# Patient Record
Sex: FEMALE | Race: WHITE | URBAN - METROPOLITAN AREA
[De-identification: names, ages, dates, MRNs, and addresses within clinical notes are randomized per-mention and may not be internally consistent; named-entity substitution may affect disease eponyms.]

---

## 2016-07-08 LAB
CREATININE, EXTERNAL: 0.7
LDL-C, EXTERNAL: 86

## 2017-01-06 ENCOUNTER — TELEPHONE (OUTPATIENT)
Dept: FAMILY MEDICINE CLINIC | Age: 37
End: 2017-01-06

## 2017-01-06 NOTE — TELEPHONE ENCOUNTER
Pt is requesting labwork; she just had an appt with gastro with [facility] [address] and she ordered her some lab work. Pt would like to know if Dr. Camila Barber could put in a lab for liver function so she can get her lab done in the office here. Please advise.

## 2017-03-20 ENCOUNTER — TELEPHONE (OUTPATIENT)
Dept: FAMILY MEDICINE CLINIC | Age: 37
End: 2017-03-20

## 2017-03-21 RX ORDER — NARATRIPTAN 2.5 MG/1
2.5 TABLET ORAL
Qty: 9 TAB | Refills: 2 | Status: SHIPPED | OUTPATIENT
Start: 2017-03-21 | End: 2018-01-29 | Stop reason: SDUPTHER

## 2017-05-18 ENCOUNTER — OFFICE VISIT (OUTPATIENT)
Dept: FAMILY MEDICINE CLINIC | Age: 37
End: 2017-05-18

## 2017-05-18 VITALS
HEIGHT: 64 IN | SYSTOLIC BLOOD PRESSURE: 101 MMHG | WEIGHT: 134 LBS | OXYGEN SATURATION: 99 % | RESPIRATION RATE: 18 BRPM | DIASTOLIC BLOOD PRESSURE: 66 MMHG | TEMPERATURE: 98.3 F | HEART RATE: 66 BPM | BODY MASS INDEX: 22.88 KG/M2

## 2017-05-18 DIAGNOSIS — J06.9 VIRAL URI: Primary | ICD-10-CM

## 2017-05-18 RX ORDER — AZITHROMYCIN 250 MG/1
TABLET, FILM COATED ORAL
Qty: 6 TAB | Refills: 0 | Status: SHIPPED | OUTPATIENT
Start: 2017-05-18 | End: 2017-05-23

## 2017-05-18 RX ORDER — MOMETASONE FUROATE 50 UG/1
2 SPRAY, METERED NASAL DAILY
Qty: 2 CONTAINER | Refills: 2 | Status: SHIPPED | OUTPATIENT
Start: 2017-05-18 | End: 2018-10-03

## 2017-05-18 NOTE — PROGRESS NOTES
1. Have you been to the ER, urgent care clinic since your last visit? Hospitalized since your last visit? No    2. Have you seen or consulted any other health care providers outside of the [address] [place] [address] since your last visit? Include any pap smears or colon screening.  Yes When: 1/2017 GI

## 2017-05-18 NOTE — PROGRESS NOTES
[facility] [address] [facility] [place] [address] Manuel Rae, [facility], [address] [place] [address]  Phone [phone]  Fax [phone]    Date of Service:  2017  Patient's Name: Víctor Albert   Patient's :  1980     Subjective/Discussion        Chief Complaint   Patient presents with    Nasal Congestion    Cough        Víctor Albert is a pleasant 43 y.o. female patient of Parvez Grover MD who presented with complaints of upper respiratory symptoms for 5 days. Woke up with sore throat Saturday. No fevers or sweats, but also hasn't checked temperature at home. Afebrile in office today  Reports mostly dry tickling cough and sinus congestion started Tuesday. No hx of allergies. Feels sick, fatigued. Usually in good health, but states she gets sick 1-2x a year. Sore throat is improved. Stomach is a little upset. Taking sudafed, nasonex, did take Afrin last night. Cough is daytime only. Daughter had a recent illness which resolved without treatment. No rashes, no diarrhea, no breathing difficulty. Exam:   VS:    Visit Vitals    /66 (BP 1 Location: Right arm, BP Patient Position: Sitting)    Pulse 66    Temp 98.3 °F (36.8 °C) (Oral)    Resp 18    Ht 5' 3.5\" (1.613 m)    Wt 134 lb (60.8 kg)    SpO2 99%    BMI 23.36 kg/m2       General:   Pleasant age appropriate female, slender, appears well, well-groomed, conversant, alert, in no acute distress.      Head:  Normocephalic, atraumatic  Ears:  External ears WNL, no pain with movement of pinna, no TTP of mastoid processes    EACs with some dark danuta cerumen in right side, clear on left    TMs WNL bilaterally with no bulging, or erythema, no medial effusions present  Eyes:  EOMI, PERRL, no pain with eye movements    No conjunctival injection, abnormal tearing, discharge, chemosis  Mouth: MMM, o/p WNL without membranes, exudates, ulcerations or petechiae  Nose:  External nares WNL    Nasal turbinates congested with clear and white mucus evident  Neck:  Neck supple with normal ROM for age, no thyromegaly, no LAD  Cardiovasc:   Regular rate and rhythm, no murmurs, no rubs, no gallops  Pulmonary:   Clear breath sounds bilaterally, good air movement,    No wheezing, no rales, no rhonchi, normal respiratory effort  Extremities:   No dependent edema, no tenderness with palpation of calves,     Warm and well-perfused at distal extremities  Skin:    No rashes noted. No results found for this or any previous visit (from the past 12 hour(s)). Encounter Diagnoses:     Encounter Diagnoses     ICD-10-CM ICD-9-CM   1. Viral URI J06.9 465.9    B97.89        Assessment/Plan:       Jefferson Ruiz was seen today for likely viral URI. D/w patient, she feels strongly that she needs treatment with Thirza Pals. Advised not likely to be of benefit, that I feel it is unnecessary treatment and risks of taking an antibiotic such as development of antibiotic resistance, infectious colitis and modification of gut diego. After discussion, patient states her understanding and preference to proceed with Thirza Pals. She will continue sudafed, nasonex and add nasal irrigation. She is to monitor for complications from antibiotic. Brissa Santana was seen today for nasal congestion and cough. Diagnoses and all orders for this visit:    Viral URI  -     mometasone (NASONEX) 50 mcg/actuation nasal spray; 2 Sprays by Both Nostrils route daily. Other orders  -     azithromycin (ZITHROMAX) 250 mg tablet; Take 2 tablets today, then take 1 tablet daily      The patient states understanding and agrees with the treatment plan. All questions were answered.     Jayla Calzada MD - Internal Medicine  5/18/2017, 2:06 PM    Patient Care Team:  Sawyer Vasquez MD as PCP - General (Internal Medicine)        Additional History     Past Medical History:   Diagnosis Date    Anxiety     Depression     Insomnia     Migraine      Past Surgical History:   Procedure Laterality Date    HX CHOLECYSTECTOMY  2009     Social History   Substance Use Topics    Smoking status: Former Smoker    Smokeless tobacco: Never Used    Alcohol use Yes      Comment: One Drink a week     Current Outpatient Prescriptions   Medication Sig    naratriptan (AMERGE) 2.5 mg tab Take 1 Tab by mouth once as needed for up to 1 dose.  topiramate (TOPAMAX) 50 mg tablet Take 1 Tab by mouth two (2) times daily (with meals).  mirtazapine (REMERON) 45 mg tablet Take 45 mg by mouth nightly.  gabapentin (NEURONTIN) 300 mg capsule Take 300 mg by mouth three (3) times daily. 1 capsule in am, 1 capsule at noon and 3 capsules at bedtime    omeprazole (PRILOSEC) 40 mg capsule Take 1 Cap by mouth daily. No current facility-administered medications for this visit. No Known Allergies         This document may have been created with the aid of dictation software. Text may contain errors, particularly phonetic errors.

## 2017-05-18 NOTE — MR AVS SNAPSHOT
Visit Information Date & Time Provider Department Dept. Phone Encounter #  
 5/18/2017  2:00 PM Marv Roldan, [facility], [place], operated [facility] [phone] [id number] Upcoming Health Maintenance Date Due DTaP/Tdap/Td series (1 - Tdap) 12/7/1999 PAP AKA CERVICAL CYTOLOGY 12/7/1999 INFLUENZA AGE 9 TO ADULT 8/1/2017 Allergies as of 5/18/2017  Review Complete On: 5/18/2017 By: Marv Roldan MD  
 No Known Allergies Current Immunizations  Never Reviewed No immunizations on file. Not reviewed this visit You Were Diagnosed With   
  
 Codes Comments Viral URI    -  Primary ICD-10-CM: J06.9, B97.89 ICD-9-CM: 465.9 Vitals BP Pulse Temp Resp Height(growth percentile) Weight(growth percentile) 101/66 (BP 1 Location: Right arm, BP Patient Position: Sitting) 66 98.3 °F (36.8 °C) (Oral) 18 5' 3.5\" (1.613 m) 134 lb (60.8 kg) SpO2 BMI OB Status Smoking Status 99% 23.36 kg/m2 IUD Former Smoker Vitals History BMI and BSA Data Body Mass Index Body Surface Area  
 23.36 kg/m 2 1.65 m 2 Preferred Pharmacy Pharmacy Name Phone [facility] [phone] [phone] Your Updated Medication List  
  
   
This list is accurate as of: 5/18/17  2:18 PM.  Always use your most recent med list.  
  
  
  
  
 azithromycin 250 mg tablet Commonly known as:  Angel Almyra Take 2 tablets today, then take 1 tablet daily  
  
 mirtazapine 45 mg tablet Commonly known as:  Anika Tamika Take 45 mg by mouth nightly. mometasone 50 mcg/actuation nasal spray Commonly known as:  NASONEX  
2 Sprays by Both Nostrils route daily. naratriptan 2.5 mg Tab Commonly known as:  Edel Fryer Take 1 Tab by mouth once as needed for up to 1 dose. NEURONTIN 300 mg capsule Generic drug:  gabapentin Take 300 mg by mouth three (3) times daily. 1 capsule in am, 1 capsule at noon and 3 capsules at bedtime  
  
 omeprazole 40 mg capsule Commonly known as:  PriLOSEC Take 1 Cap by mouth daily. topiramate 50 mg tablet Commonly known as:  TOPAMAX Take 1 Tab by mouth two (2) times daily (with meals). Prescriptions Sent to Pharmacy Refills  
 mometasone (NASONEX) 50 mcg/actuation nasal spray 2 Si Sprays by Both Nostrils route daily. Class: Normal  
 Pharmacy: [facility] [address] [place] [address] AT [address] [place] [address] Ph #: [phone] Route: Both Nostrils  
 azithromycin (ZITHROMAX) 250 mg tablet 0 Sig: Take 2 tablets today, then take 1 tablet daily Class: Normal  
 Pharmacy: [facility] [address] AT [address] [place] [address] Ph #: [phone] Patient Instructions Saline Nasal Washes: Care Instructions Your Care Instructions Saline nasal washes help keep the nasal passages open by washing out thick or dried mucus. This simple remedy can help relieve symptoms of allergies, sinusitis, and colds. It also can make the nose feel more comfortable by keeping the mucous membranes moist. You may notice a little burning sensation in your nose the first few times you use the solution, but this usually gets better in a few days. Follow-up care is a key part of your treatment and safety. Be sure to make and go to all appointments, and call your doctor if you are having problems. It's also a good idea to know your test results and keep a list of the medicines you take. How can you care for yourself at home? · You can buy premixed saline solution in a squeeze bottle or other sinus rinse products at a drugstore. Read and follow the instructions on the label.  
· You also can make your own saline solution by adding 1 teaspoon of salt and 1 teaspoon of baking soda to 2 cups of distilled water. · If you use a homemade solution, pour a small amount into a clean bowl. Using a rubber bulb syringe, squeeze the syringe and place the tip in the salt water. Pull a small amount of the salt water into the syringe by relaxing your hand. · Sit down with your head tilted slightly back. Do not lie down. Put the tip of the bulb syringe or the squeeze bottle a little way into one of your nostrils. Gently drip or squirt a few drops into the nostril. Repeat with the other nostril. Some sneezing and gagging are normal at first. 
· Gently blow your nose. · Wipe the syringe or bottle tip clean after each use. · Repeat this 2 or 3 times a day. · Use nasal washes gently if you have nosebleeds often. When should you call for help? Watch closely for changes in your health, and be sure to contact your doctor if: 
· You often get nosebleeds. · You have problems doing the nasal washes. Where can you learn more? Go to http://les-hitesh.info/. Enter [id number] [phone] in the search box to learn more about \"Saline Nasal Washes: Care Instructions. \" Current as of: July 29, 2016 Content Version: 11.2 © 0789-1076 [employer]. Care instructions adapted under license by [employer] (which disclaims liability or warranty for this information). If you have questions about a medical condition or this instruction, always ask your healthcare professional. [place] [address] any warranty or liability for your use of this information. Introducing [facility] & HEALTH SERVICES! [employer] introduces [employer] patient portal. Now you can access parts of your medical record, email your doctor's office, and request medication refills online. 1. In your internet browser, go to [email or web address] 2. Click on the First Time User? Click Here link in the Sign In box. You will see the New Member Sign Up page. 3. Enter your [employer]Stream Access Code exactly as it appears below. You will not need to use this code after youve completed the sign-up process. If you do not sign up before the expiration date, you must request a new code. · [employer] Access Code: [email or web address] Expires: 8/16/2017  2:18 PM 
 
4. Enter the last four digits of your Social Security Number (xxxx) and Date of Birth (mm/dd/yyyy) as indicated and click Submit. You will be taken to the next sign-up page. 5. Create a SaleStream ID. This will be your SaleStream login ID and cannot be changed, so think of one that is secure and easy to remember. 6. Create a SaleStream password. You can change your password at any time. 7. Enter your Password Reset Question and Answer. This can be used at a later time if you forget your password. 8. Enter your e-mail address. You will receive e-mail notification when new information is available in [address] 9. Click Sign Up. You can now view and download portions of your medical record. 10. Click the Download Summary menu link to download a portable copy of your medical information. If you have questions, please visit the Frequently Asked Questions section of the [employer] website. Remember, [employer] is NOT to be used for urgent needs. For medical emergencies, dial 911. Now available from your iPhone and Android! Please provide this summary of care documentation to your next provider. Your primary care clinician is listed as Camila Carreon. If you have any questions after today's visit, please call [phone].

## 2017-05-18 NOTE — PATIENT INSTRUCTIONS
Saline Nasal Washes: Care Instructions  Your Care Instructions  Saline nasal washes help keep the nasal passages open by washing out thick or dried mucus. This simple remedy can help relieve symptoms of allergies, sinusitis, and colds. It also can make the nose feel more comfortable by keeping the mucous membranes moist. You may notice a little burning sensation in your nose the first few times you use the solution, but this usually gets better in a few days. Follow-up care is a key part of your treatment and safety. Be sure to make and go to all appointments, and call your doctor if you are having problems. It's also a good idea to know your test results and keep a list of the medicines you take. How can you care for yourself at home? · You can buy premixed saline solution in a squeeze bottle or other sinus rinse products at a drugstore. Read and follow the instructions on the label. · You also can make your own saline solution by adding 1 teaspoon of salt and 1 teaspoon of baking soda to 2 cups of distilled water. · If you use a homemade solution, pour a small amount into a clean bowl. Using a rubber bulb syringe, squeeze the syringe and place the tip in the salt water. Pull a small amount of the salt water into the syringe by relaxing your hand. · Sit down with your head tilted slightly back. Do not lie down. Put the tip of the bulb syringe or the squeeze bottle a little way into one of your nostrils. Gently drip or squirt a few drops into the nostril. Repeat with the other nostril. Some sneezing and gagging are normal at first.  · Gently blow your nose. · Wipe the syringe or bottle tip clean after each use. · Repeat this 2 or 3 times a day. · Use nasal washes gently if you have nosebleeds often. When should you call for help? Watch closely for changes in your health, and be sure to contact your doctor if:  · You often get nosebleeds. · You have problems doing the nasal washes.   Where can you learn more? Go to http://les-hitesh.info/. Enter [phone] [id number] [phone] in the search box to learn more about \"Saline Nasal Washes: Care Instructions. \"  Current as of: July 29, 2016  Content Version: 11.2  © 1466-8944 [employer]. Care instructions adapted under license by [employer] (which disclaims liability or warranty for this information). If you have questions about a medical condition or this instruction, always ask your healthcare professional. [address] any warranty or liability for your use of this information.

## 2017-08-22 DIAGNOSIS — G43.709 CHRONIC MIGRAINE WITHOUT AURA WITHOUT STATUS MIGRAINOSUS, NOT INTRACTABLE: ICD-10-CM

## 2017-08-22 RX ORDER — TOPIRAMATE 50 MG/1
50 TABLET, FILM COATED ORAL 2 TIMES DAILY WITH MEALS
Qty: 180 TAB | Refills: 0 | Status: SHIPPED | OUTPATIENT
Start: 2017-08-22 | End: 2018-01-29 | Stop reason: SDUPTHER

## 2017-11-03 RX ORDER — GABAPENTIN 300 MG/1
300 CAPSULE ORAL 3 TIMES DAILY
Status: CANCELLED | OUTPATIENT
Start: 2017-11-03

## 2017-11-03 NOTE — TELEPHONE ENCOUNTER
Pt states rx is typically written by an NP at her psych office. She states she is almost out and their office is not open. Pt has an appt with them Wednesday but will be out before then. Pt is asking for enough medication to get her through to her appt Wednesday.

## 2018-01-29 DIAGNOSIS — G43.709 CHRONIC MIGRAINE WITHOUT AURA WITHOUT STATUS MIGRAINOSUS, NOT INTRACTABLE: ICD-10-CM

## 2018-01-29 RX ORDER — TOPIRAMATE 50 MG/1
50 TABLET, FILM COATED ORAL 2 TIMES DAILY WITH MEALS
Qty: 180 TAB | Refills: 0 | Status: SHIPPED | OUTPATIENT
Start: 2018-01-29 | End: 2018-08-08 | Stop reason: SDUPTHER

## 2018-01-29 RX ORDER — NARATRIPTAN 2.5 MG/1
2.5 TABLET ORAL
Qty: 9 TAB | Refills: 2 | Status: SHIPPED | OUTPATIENT
Start: 2018-01-29 | End: 2018-10-03 | Stop reason: SDUPTHER

## 2018-08-08 DIAGNOSIS — G43.709 CHRONIC MIGRAINE WITHOUT AURA WITHOUT STATUS MIGRAINOSUS, NOT INTRACTABLE: ICD-10-CM

## 2018-08-08 RX ORDER — TOPIRAMATE 50 MG/1
50 TABLET, FILM COATED ORAL 2 TIMES DAILY WITH MEALS
Qty: 60 TAB | Refills: 0 | Status: SHIPPED | OUTPATIENT
Start: 2018-08-08 | End: 2018-10-03 | Stop reason: SDUPTHER

## 2018-08-08 NOTE — TELEPHONE ENCOUNTER
Patient is requesting refill of Topamax  Last Filled 01/29/18  Qty 180   Refills 0     Last Visit: 05/18/2017  No future appointments.       Pharmacy Confirmed

## 2018-09-26 DIAGNOSIS — G43.709 CHRONIC MIGRAINE WITHOUT AURA WITHOUT STATUS MIGRAINOSUS, NOT INTRACTABLE: ICD-10-CM

## 2018-09-26 RX ORDER — TOPIRAMATE 50 MG/1
50 TABLET, FILM COATED ORAL 2 TIMES DAILY WITH MEALS
Qty: 60 TAB | Refills: 0 | Status: CANCELLED | OUTPATIENT
Start: 2018-09-26

## 2018-09-26 RX ORDER — NARATRIPTAN 2.5 MG/1
2.5 TABLET ORAL
Qty: 9 TAB | Refills: 2 | Status: CANCELLED | OUTPATIENT
Start: 2018-09-26 | End: 2018-09-26

## 2018-10-03 ENCOUNTER — OFFICE VISIT (OUTPATIENT)
Dept: FAMILY MEDICINE CLINIC | Age: 38
End: 2018-10-03

## 2018-10-03 VITALS
HEART RATE: 72 BPM | RESPIRATION RATE: 12 BRPM | BODY MASS INDEX: 23.35 KG/M2 | HEIGHT: 64 IN | SYSTOLIC BLOOD PRESSURE: 99 MMHG | TEMPERATURE: 98.8 F | WEIGHT: 136.8 LBS | DIASTOLIC BLOOD PRESSURE: 63 MMHG | OXYGEN SATURATION: 98 %

## 2018-10-03 DIAGNOSIS — G43.709 CHRONIC MIGRAINE WITHOUT AURA WITHOUT STATUS MIGRAINOSUS, NOT INTRACTABLE: Primary | ICD-10-CM

## 2018-10-03 DIAGNOSIS — Z23 ENCOUNTER FOR IMMUNIZATION: ICD-10-CM

## 2018-10-03 DIAGNOSIS — G43.709 CHRONIC MIGRAINE WITHOUT AURA WITHOUT STATUS MIGRAINOSUS, NOT INTRACTABLE: ICD-10-CM

## 2018-10-03 RX ORDER — GABAPENTIN 300 MG/1
CAPSULE ORAL
COMMUNITY
End: 2018-10-03

## 2018-10-03 RX ORDER — NARATRIPTAN 2.5 MG/1
2.5 TABLET ORAL
Qty: 9 TAB | Refills: 2 | Status: SHIPPED | OUTPATIENT
Start: 2018-10-03 | End: 2018-10-03

## 2018-10-03 RX ORDER — TOPIRAMATE 50 MG/1
50 TABLET, FILM COATED ORAL 2 TIMES DAILY
Qty: 60 TAB | Refills: 5 | Status: SHIPPED | OUTPATIENT
Start: 2018-10-03 | End: 2018-10-04 | Stop reason: SDUPTHER

## 2018-10-03 NOTE — PROGRESS NOTES
Raquel Kimbrough is a 42 y.o. female (: 1980) presenting to address:    Chief Complaint   Patient presents with    Medication Refill       Vitals:    10/03/18 1545   BP: 99/63   Pulse: 72   Resp: 12   Temp: 98.8 °F (37.1 °C)   TempSrc: Oral   SpO2: 98%   Weight: 136 lb 12.8 oz (62.1 kg)   Height: 5' 3.5\" (1.613 m)   PainSc:   0 - No pain       Hearing/Vision:   No exam data present    Learning Assessment:     Learning Assessment 2015   PRIMARY LEARNER Patient   HIGHEST LEVEL OF EDUCATION - PRIMARY LEARNER  > 4 YEARS OF COLLEGE   BARRIERS PRIMARY LEARNER NONE   CO-LEARNER CAREGIVER No   PRIMARY LANGUAGE ENGLISH    NEED No   LEARNER PREFERENCE PRIMARY READING   LEARNING SPECIAL TOPICS none   ANSWERED BY patient   RELATIONSHIP SELF   ASSESSMENT COMMENT n/a     Depression Screening:     PHQ over the last two weeks 2015   PHQ Not Done Active Diagnosis of Depression or Bipolar Disorder     Fall Risk Assessment:   No flowsheet data found. Abuse Screening:     Abuse Screening Questionnaire 2016   Do you ever feel afraid of your partner? N   Are you in a relationship with someone who physically or mentally threatens you? N   Is it safe for you to go home? Y     Coordination of Care Questionaire:   1. Have you been to the ER, urgent care clinic since your last visit? Hospitalized since your last visit? NO    2. Have you seen or consulted any other health care providers outside of the [address] [place] [address] since your last visit? Include any pap smears or colon screening. NO    Advanced Directive:   1. Do you have an Advanced Directive? NO    2. Would you like information on Advanced Directives? NO    Flu Immunization/s administered 10/3/2018 by Petra Tracy in right deltoid. Patient tolerated procedure well. No reactions noted.

## 2018-10-03 NOTE — MR AVS SNAPSHOT
[address] [facility] [address] [phone] Patient: Tata Dela Cruz MRN: [id number] :1980 Visit Information Date & Time Provider Department Dept. Phone Encounter #  
 10/3/2018  3:45 PM Emma Sam Saritha [facility] [phone] [id number] Follow-up Instructions Return in about 6 months (around 4/3/2019). Upcoming Health Maintenance Date Due DTaP/Tdap/Td series (1 - Tdap) 1999 PAP AKA CERVICAL CYTOLOGY 1999 Influenza Age 5 to Adult 2018 Allergies as of 10/3/2018  Review Complete On: 10/3/2018 By: Emma Sam MD  
 No Known Allergies Current Immunizations  Never Reviewed Name Date Influenza Vaccine (Quad) PF  Incomplete Not reviewed this visit You Were Diagnosed With   
  
 Codes Comments Chronic migraine without aura without status migrainosus, not intractable    -  Primary ICD-10-CM: Y73.975 ICD-9-CM: 346.70 Encounter for immunization     ICD-10-CM: E48 ICD-9-CM: V03.89 Vitals BP Pulse Temp Resp Height(growth percentile) Weight(growth percentile) 99/63 72 98.8 °F (37.1 °C) (Oral) 12 5' 3.5\" (1.613 m) 136 lb 12.8 oz (62.1 kg) SpO2 BMI OB Status Smoking Status 98% 23.85 kg/m2 IUD Former Smoker Vitals History BMI and BSA Data Body Mass Index Body Surface Area  
 23.85 kg/m 2 1.67 m 2 Preferred Pharmacy Pharmacy Name Phone [facility] [phone] [phone] Your Updated Medication List  
  
   
This list is accurate as of 10/3/18  4:13 PM.  Always use your most recent med list.  
  
  
  
  
 MIRENA 20 mcg/24 hr (5 years) Iud  
Generic drug:  levonorgestrel Take by intrauterine route. mirtazapine 45 mg tablet Commonly known as:  Dominga Darshana Take 45 mg by mouth nightly. naratriptan 2.5 mg Tab Commonly known as:  Derrick halfway Take 1 Tab by mouth once as needed for up to 1 dose. NEURONTIN 300 mg capsule Generic drug:  gabapentin Take 300 mg by mouth two (2) times a day. 1 capsule in am, 1 capsule at noon and 3 capsules at bedtime  
  
 topiramate 50 mg tablet Commonly known as:  TOPAMAX Take 1 Tab by mouth two (2) times a day. Prescriptions Sent to Pharmacy Refills  
 naratriptan (AMERGE) 2.5 mg tab 2 Sig: Take 1 Tab by mouth once as needed for up to 1 dose. Class: Normal  
 Pharmacy: [facility] [address] AT [address] [place] [address] Ph #: [phone] Route: Oral  
 topiramate (TOPAMAX) 50 mg tablet 5 Sig: Take 1 Tab by mouth two (2) times a day. Class: Normal  
 Pharmacy: [facility] [address] AT [address] [place] [address] Ph #: [phone] Route: Oral  
  
We Performed the Following INFLUENZA VIRUS VAC QUAD,SPLIT,PRESV FREE SYRINGE IM [id number] CPT(R)] DE IMMUNIZ ADMIN,1 SINGLE/COMB VAC/TOXOID [id number] CPT(R)] Follow-up Instructions Return in about 6 months (around 4/3/2019). To-Do List   
 10/03/2018 Lab:  CBC WITH AUTOMATED DIFF   
  
 10/03/2018 Lab:  METABOLIC PANEL, COMPREHENSIVE Introducing [facility] & HEALTH SERVICES! [facility] introduces [employer] patient portal. Now you can access parts of your medical record, email your doctor's office, and request medication refills online. 1. In your internet browser, go to [email or web address] 2. Click on the First Time User? Click Here link in the Sign In box. You will see the New Member Sign Up page. 3. Enter your [employer] Access Code exactly as it appears below. You will not need to use this code after youve completed the sign-up process. If you do not sign up before the expiration date, you must request a new code. · [employer] Access Code: [email or web address] Expires: 1/1/2019  4:13 PM 
 
4. Enter the last four digits of your Social Security Number (xxxx) and Date of Birth (mm/dd/yyyy) as indicated and click Submit. You will be taken to the next sign-up page. 5. Create a Mac[employer]m ID. This will be your [employer] login ID and cannot be changed, so think of one that is secure and easy to remember. 6. Create a [employer] password. You can change your password at any time. 7. Enter your Password Reset Question and Answer. This can be used at a later time if you forget your password. 8. Enter your e-mail address. You will receive e-mail notification when new information is available in [address] 9. Click Sign Up. You can now view and download portions of your medical record. 10. Click the Download Summary menu link to download a portable copy of your medical information. If you have questions, please visit the Frequently Asked Questions section of the [employer] website. Remember, [employer] is NOT to be used for urgent needs. For medical emergencies, dial 911. Now available from your iPhone and Android! Please provide this summary of care documentation to your next provider. Your primary care clinician is listed as Alexsandra Tolliver. If you have any questions after today's visit, please call [phone].

## 2018-10-03 NOTE — PROGRESS NOTES
HISTORY OF PRESENT ILLNESS  Raquel Kimbrough is a 42 y.o. female. HPI  Migraine, stable, on Topamax, use Amerge prn, mainly around menstruation, need refills  Review of Systems   Constitutional: Negative for fever. Cardiovascular: Negative for chest pain. Gastrointestinal: Negative for nausea and vomiting. Physical Exam   Constitutional: She is oriented to person, place, and time. She appears well-developed and well-nourished. Cardiovascular: Normal rate, regular rhythm and normal heart sounds. Pulmonary/Chest: Effort normal and breath sounds normal.   Abdominal: Soft. There is no tenderness. Neurological: She is alert and oriented to person, place, and time. Psychiatric: She has a normal mood and affect. Her behavior is normal.   Vitals reviewed. ASSESSMENT and PLAN  Diagnoses and all orders for this visit:    1. Chronic migraine without aura without status migrainosus, not intractable, stable  -     naratriptan (AMERGE) 2.5 mg tab; Take 1 Tab by mouth once as needed for up to 1 dose. -     topiramate (TOPAMAX) 50 mg tablet; Take 1 Tab by mouth two (2) times a day. -     CBC WITH AUTOMATED DIFF; Future  -     METABOLIC PANEL, COMPREHENSIVE; Future    2.  Encounter for immunization  -     Influenza virus vaccine (QUADRIVALENT PRES FREE SYRINGE) IM (31634)  -     OR IMMUNIZ ADMIN,1 SINGLE/COMB VAC/TOXOID    rtc 6 mos

## 2018-10-31 ENCOUNTER — TELEPHONE (OUTPATIENT)
Dept: FAMILY MEDICINE CLINIC | Age: 38
End: 2018-10-31

## 2019-02-18 ENCOUNTER — OFFICE VISIT (OUTPATIENT)
Dept: FAMILY MEDICINE CLINIC | Age: 39
End: 2019-02-18

## 2019-02-18 VITALS
HEIGHT: 64 IN | WEIGHT: 136.8 LBS | HEART RATE: 75 BPM | OXYGEN SATURATION: 97 % | RESPIRATION RATE: 14 BRPM | TEMPERATURE: 97.7 F | DIASTOLIC BLOOD PRESSURE: 72 MMHG | BODY MASS INDEX: 23.35 KG/M2 | SYSTOLIC BLOOD PRESSURE: 117 MMHG

## 2019-02-18 DIAGNOSIS — R30.0 DYSURIA: Primary | ICD-10-CM

## 2019-02-18 RX ORDER — NITROFURANTOIN 25; 75 MG/1; MG/1
100 CAPSULE ORAL 2 TIMES DAILY
Qty: 10 CAP | Refills: 0 | Status: SHIPPED | OUTPATIENT
Start: 2019-02-18 | End: 2019-02-23

## 2019-02-18 RX ORDER — NARATRIPTAN 2.5 MG/1
2.5 TABLET ORAL
COMMUNITY
End: 2019-05-13 | Stop reason: SDUPTHER

## 2019-02-18 RX ORDER — ESCITALOPRAM OXALATE 10 MG/1
10 TABLET ORAL DAILY
COMMUNITY
End: 2021-02-24

## 2019-02-18 NOTE — PROGRESS NOTES
HISTORY OF PRESENT ILLNESS  Yolanda Kohli is a 34 y.o. female. HPI  C/o dysuria, started a week ago, associated with urgency, no fever  She is taking Azo otc for the dysuria  Review of Systems   Constitutional: Negative for chills and fever. Gastrointestinal: Negative for nausea. Genitourinary: Negative for hematuria. Physical Exam   Abdominal: Soft. There is tenderness in the suprapubic area. There is no CVA tenderness. Vitals reviewed. ASSESSMENT and PLAN  Diagnoses and all orders for this visit:    1. Dysuria  -     URINALYSIS W/ RFLX MICROSCOPIC; Future  -     CULTURE, URINE; Future  -     nitrofurantoin, macrocrystal-monohydrate, (MACROBID) 100 mg capsule; Take 1 Cap by mouth two (2) times a day for 5 days.

## 2019-02-18 NOTE — PROGRESS NOTES
Neri Elizabeth is a 34 y.o. female (: 1980) presenting to address:    Chief Complaint   Patient presents with    Follow-up       Vitals:    19 1118   BP: 117/72   Pulse: 75   Resp: 14   Temp: 97.7 °F (36.5 °C)   TempSrc: Oral   SpO2: 97%   Weight: 136 lb 12.8 oz (62.1 kg)   Height: 5' 3.5\" (1.613 m)   PainSc:   0 - No pain       Hearing/Vision:   No exam data present    Learning Assessment:     Learning Assessment 2015   PRIMARY LEARNER Patient   HIGHEST LEVEL OF EDUCATION - PRIMARY LEARNER  > 4 YEARS OF COLLEGE   BARRIERS PRIMARY LEARNER NONE   CO-LEARNER CAREGIVER No   PRIMARY LANGUAGE ENGLISH    NEED No   LEARNER PREFERENCE PRIMARY READING   LEARNING SPECIAL TOPICS none   ANSWERED BY patient   RELATIONSHIP SELF   ASSESSMENT COMMENT n/a     Depression Screening:     3 most recent PHQ Screens 2015   PHQ Not Done Active Diagnosis of Depression or Bipolar Disorder     Fall Risk Assessment:   No flowsheet data found. Abuse Screening:     Abuse Screening Questionnaire 2016   Do you ever feel afraid of your partner? N   Are you in a relationship with someone who physically or mentally threatens you? N   Is it safe for you to go home? Y     Coordination of Care Questionaire:   1. Have you been to the ER, urgent care clinic since your last visit? Hospitalized since your last visit? NO    2. Have you seen or consulted any other health care providers outside of the [address] [place] [address] since your last visit? Include any pap smears or colon screening. NO    Advanced Directive:   1. Do you have an Advanced Directive? NO    2. Would you like information on Advanced Directives?  NO

## 2019-02-19 LAB
BACTERIA,BACTU: PRESENT
BILIRUB UR QL: NEGATIVE
EPITHELIAL,EPSU: ABNORMAL /HPF (ref 0–2)
GLUCOSE UR QL: NEGATIVE MG/DL
HGB UR QL STRIP: ABNORMAL
KETONES UR QL STRIP.AUTO: NEGATIVE MG/DL
LEUKOCYTE ESTERASE: NEGATIVE
NITRITE UR QL STRIP.AUTO: POSITIVE
PH UR STRIP: 5 PH (ref 5–8)
PROT UR QL STRIP: ABNORMAL MG/DL
RBC #/AREA URNS HPF: ABNORMAL /HPF
SP GR UR: 1.02 (ref 1–1.03)
UROBILINOGEN UR STRIP-MCNC: 4 MG/DL
WBC URNS QL MICRO: ABNORMAL /HPF (ref 0–2)

## 2019-02-21 LAB — RESULT: ABNORMAL

## 2019-04-30 ENCOUNTER — OFFICE VISIT (OUTPATIENT)
Dept: FAMILY MEDICINE CLINIC | Age: 39
End: 2019-04-30

## 2019-04-30 VITALS
WEIGHT: 136 LBS | HEIGHT: 63 IN | BODY MASS INDEX: 24.1 KG/M2 | OXYGEN SATURATION: 100 % | DIASTOLIC BLOOD PRESSURE: 62 MMHG | RESPIRATION RATE: 16 BRPM | SYSTOLIC BLOOD PRESSURE: 104 MMHG | HEART RATE: 69 BPM | TEMPERATURE: 97.3 F

## 2019-04-30 DIAGNOSIS — G43.709 CHRONIC MIGRAINE WITHOUT AURA WITHOUT STATUS MIGRAINOSUS, NOT INTRACTABLE: ICD-10-CM

## 2019-04-30 RX ORDER — ONDANSETRON 8 MG/1
8 TABLET, ORALLY DISINTEGRATING ORAL
Qty: 30 TAB | Refills: 0 | Status: SHIPPED | OUTPATIENT
Start: 2019-04-30 | End: 2021-01-13 | Stop reason: SDUPTHER

## 2019-04-30 RX ORDER — TOPIRAMATE 50 MG/1
TABLET, FILM COATED ORAL
Qty: 180 TAB | Refills: 1 | Status: SHIPPED | OUTPATIENT
Start: 2019-04-30 | End: 2019-05-13 | Stop reason: ALTCHOICE

## 2019-04-30 NOTE — PROGRESS NOTES
HISTORY OF PRESENT ILLNESS Nunzio Cogan is a 34 y.o. female. HPI 
C/o Migraine headaches, not controlled, she stated that she has been getting at least 10-15 migraines a month, using Amerge and exedrin migraine  as needed to control her Migraines, but she is using them very frequently, about every other day,  she is already on Topamax 50 mg po BID for Migraine prevention Her headaches are pulsating, either on the right or on the left side of her head, last for few hours No headache now Review of Systems Cardiovascular: Negative for chest pain and palpitations. Gastrointestinal: Positive for nausea (only with her migraine). Neurological: Negative for dizziness, tingling, sensory change and focal weakness. Physical Exam  
Constitutional: She is oriented to person, place, and time. Cardiovascular: Normal rate, regular rhythm and normal heart sounds. Pulmonary/Chest: Effort normal and breath sounds normal.  
Neurological: She is alert and oriented to person, place, and time. Vitals reviewed. ASSESSMENT and PLAN Diagnoses and all orders for this visit: 
 
1. Chronic migraine without aura without status migrainosus, not intractable, not controlled, will add Aimovig for migraine prevention since she is getting headaches very frequently, will refer to Neurology if not better 
-     erenumab-aooe (AIMOVIG AUTOINJECTOR) 70 mg/mL atIn; 70 mg by SubCUTAneous route every thirty (30) days. Indications: Migraine Prevention -     topiramate (TOPAMAX) 50 mg tablet; TAKE 1 TABLET BY MOUTH TWICE DAILY  Indications: Migraine Prevention 
-     ondansetron (ZOFRAN ODT) 8 mg disintegrating tablet; Take 1 Tab by mouth every eight (8) hours as needed for Nausea. rtc 2 mos

## 2019-04-30 NOTE — PROGRESS NOTES
Lendell Curling is a 34 y.o. female (: 1980) presenting to address: Chief Complaint Patient presents with  Medication Evaluation Vitals:  
 19 1601 BP: 104/62 Pulse: 69 Resp: 16 Temp: 97.3 °F (36.3 °C) TempSrc: Oral  
SpO2: 100% Weight: 136 lb (61.7 kg) Height: 5' 3\" (1.6 m) PainSc:   0 - No pain LMP: 2019 Hearing/Vision: No exam data present Learning Assessment:  
 
Learning Assessment 2015 PRIMARY LEARNER Patient HIGHEST LEVEL OF EDUCATION - PRIMARY LEARNER  > 4 YEARS OF COLLEGE  
BARRIERS PRIMARY LEARNER NONE  
CO-LEARNER CAREGIVER No  
PRIMARY LANGUAGE ENGLISH  NEED No  
LEARNER PREFERENCE PRIMARY READING  
LEARNING SPECIAL TOPICS none ANSWERED BY patient RELATIONSHIP SELF  
ASSESSMENT COMMENT n/a Depression Screening:  
 
3 most recent PHQ Screens 2019 PHQ Not Done - Little interest or pleasure in doing things Not at all Feeling down, depressed, irritable, or hopeless Not at all Total Score PHQ 2 0 Fall Risk Assessment:  
No flowsheet data found. Abuse Screening:  
 
Abuse Screening Questionnaire 2016 Do you ever feel afraid of your partner? Marvene Hence Are you in a relationship with someone who physically or mentally threatens you? Marvene Hence Is it safe for you to go home? Refugio Vania Coordination of Care Questionaire: 1. Have you been to the ER, urgent care clinic since your last visit? Hospitalized since your last visit? NO 
 
2. Have you seen or consulted any other health care providers outside of the [address] [place] [address] since your last visit? Include any pap smears or colon screening. NO Advanced Directive: 1. Do you have an Advanced Directive? NO 
 
2. Would you like information on Advanced Directives?  NO

## 2019-05-13 RX ORDER — NARATRIPTAN 2.5 MG/1
2.5 TABLET ORAL
Qty: 9 TAB | Refills: 3 | Status: SHIPPED | OUTPATIENT
Start: 2019-05-13 | End: 2020-01-24

## 2019-05-13 NOTE — TELEPHONE ENCOUNTER
Patient called and is requesting a prescription for Trokendi 100 mg ER. To be sent to her pharmacy. She is also requesting a refill of Amerge. Last OV: 04/30/19  No future appointments.

## 2019-05-14 DIAGNOSIS — G43.709 CHRONIC MIGRAINE WITHOUT AURA WITHOUT STATUS MIGRAINOSUS, NOT INTRACTABLE: Primary | ICD-10-CM

## 2019-08-02 DIAGNOSIS — G43.709 CHRONIC MIGRAINE WITHOUT AURA WITHOUT STATUS MIGRAINOSUS, NOT INTRACTABLE: ICD-10-CM

## 2019-08-05 RX ORDER — ERENUMAB-AOOE 70 MG/ML
INJECTION SUBCUTANEOUS
Qty: 3 EACH | Refills: 1 | Status: SHIPPED | OUTPATIENT
Start: 2019-08-05 | End: 2020-02-19

## 2019-09-26 NOTE — TELEPHONE ENCOUNTER
Pt called back to let Dr. Jenn Soares know that she received a call from the on call doctor at her therapist office and they are filling medication.      Please disregard request. Quality 110: Preventive Care And Screening: Influenza Immunization: Influenza Immunization previously received during influenza season Detail Level: Detailed

## 2019-12-26 DIAGNOSIS — G43.709 CHRONIC MIGRAINE WITHOUT AURA WITHOUT STATUS MIGRAINOSUS, NOT INTRACTABLE: ICD-10-CM

## 2019-12-26 NOTE — TELEPHONE ENCOUNTER
Requested Prescriptions     Pending Prescriptions Disp Refills    topiramate ER (TROKENDI XR) 100 mg capsule 30 Cap 5    gabapentin (NEURONTIN) 300 mg capsule       Sig: Take 1 Cap by mouth two (2) times a day. Max Daily Amount: 600 mg. 1 capsule in am, 1 capsule at noon and 3 capsules at bedtime       .

## 2019-12-27 RX ORDER — GABAPENTIN 300 MG/1
300 CAPSULE ORAL 2 TIMES DAILY
OUTPATIENT
Start: 2019-12-27

## 2020-02-18 DIAGNOSIS — G43.709 CHRONIC MIGRAINE WITHOUT AURA WITHOUT STATUS MIGRAINOSUS, NOT INTRACTABLE: ICD-10-CM

## 2020-02-19 RX ORDER — ERENUMAB-AOOE 70 MG/ML
INJECTION SUBCUTANEOUS
Qty: 3 ML | Refills: 1 | Status: SHIPPED | OUTPATIENT
Start: 2020-02-19 | End: 2020-08-16

## 2020-04-02 ENCOUNTER — VIRTUAL VISIT (OUTPATIENT)
Dept: FAMILY MEDICINE CLINIC | Age: 40
End: 2020-04-02

## 2020-04-02 DIAGNOSIS — J45.21 MILD INTERMITTENT REACTIVE AIRWAY DISEASE WITH ACUTE EXACERBATION: Primary | ICD-10-CM

## 2020-04-02 DIAGNOSIS — J45.21 MILD INTERMITTENT REACTIVE AIRWAY DISEASE WITH ACUTE EXACERBATION: ICD-10-CM

## 2020-04-02 RX ORDER — PREDNISONE 10 MG/1
TABLET ORAL
Qty: 21 TAB | Refills: 0 | Status: SHIPPED | OUTPATIENT
Start: 2020-04-02 | End: 2021-02-24

## 2020-04-02 RX ORDER — MONTELUKAST SODIUM 10 MG/1
TABLET ORAL
Qty: 90 TAB | Refills: 0 | Status: SHIPPED | OUTPATIENT
Start: 2020-04-02

## 2020-04-02 RX ORDER — FLUTICASONE PROPIONATE 50 MCG
SPRAY, SUSPENSION (ML) NASAL
Qty: 1 BOTTLE | Refills: 0 | Status: SHIPPED | OUTPATIENT
Start: 2020-04-02 | End: 2020-04-02

## 2020-04-02 RX ORDER — ALBUTEROL SULFATE 90 UG/1
1 AEROSOL, METERED RESPIRATORY (INHALATION)
Qty: 1 INHALER | Refills: 0 | Status: SHIPPED | OUTPATIENT
Start: 2020-04-02

## 2020-04-02 RX ORDER — FLUTICASONE PROPIONATE 50 MCG
SPRAY, SUSPENSION (ML) NASAL
Qty: 48 G | Refills: 0 | Status: SHIPPED | OUTPATIENT
Start: 2020-04-02

## 2020-04-02 RX ORDER — CETIRIZINE HCL 10 MG
10 TABLET ORAL DAILY
COMMUNITY

## 2020-04-02 RX ORDER — MONTELUKAST SODIUM 10 MG/1
10 TABLET ORAL DAILY
Qty: 30 TAB | Refills: 1 | Status: SHIPPED | OUTPATIENT
Start: 2020-04-02 | End: 2020-04-02

## 2020-04-02 RX ORDER — BUPROPION HYDROCHLORIDE 150 MG/1
150 TABLET ORAL
COMMUNITY

## 2020-04-02 NOTE — PROGRESS NOTES
Consent: Bill Velásquez, who was seen by synchronous (real-time) audio-video technology, and/or her healthcare decision maker, is aware that this patient-initiated, Telehealth encounter on 4/2/2020 is a billable service, with coverage as determined by her insurance carrier. She is aware that she may receive a bill and has provided verbal consent to proceed: Yes. Assessment & Plan:   Diagnoses and all orders for this visit:    1. Mild intermittent reactive airway disease with acute exacerbation  -     predniSONE (STERAPRED DS) 10 mg dose pack; See administration instruction per 10mg dose pack  -     montelukast (SINGULAIR) 10 mg tablet; Take 1 Tab by mouth daily. -     albuterol (PROVENTIL HFA, VENTOLIN HFA, PROAIR HFA) 90 mcg/actuation inhaler; Take 1 Puff by inhalation every six (6) hours as needed for Wheezing or Shortness of Breath. -     fluticasone propionate (FLONASE) 50 mcg/actuation nasal spray; 2 sprays in each nostril daily    Call if not better next week, sooner if needed              712  Subjective:   Bill Velásquez is a 37 y.o. female who was seen for Allergies (nasal congestion, tightness in chest, wheezing times one week.)  c/o started over a week ago with nasal congestion, and wheezing off/on, associated with mild sob, she has been using her son's albuterol which is resolving her wheezing and sob, no s/t, no ear pain, no cough, no fever, pt used albuterol earlier and she seems comfortable now with no sob or wheezing    Prior to Admission medications    Medication Sig Start Date End Date Taking? Authorizing Provider   cetirizine (ZyrTEC) 10 mg tablet Take 10 mg by mouth daily. Yes Provider, Historical   buPROPion XL (WELLBUTRIN XL) 150 mg tablet Take 150 mg by mouth every morning.    Yes Provider, Historical   predniSONE (STERAPRED DS) 10 mg dose pack See administration instruction per 10mg dose pack 4/2/20  Yes Myron ZULETA MD   montelukast (SINGULAIR) 10 mg tablet Take 1 Tab by mouth daily. 4/2/20  Yes Bushra Machado MD   albuterol (PROVENTIL HFA, VENTOLIN HFA, PROAIR HFA) 90 mcg/actuation inhaler Take 1 Puff by inhalation every six (6) hours as needed for Wheezing or Shortness of Breath. 4/2/20  Yes Bushra Machado MD   fluticasone propionate [facility]) 50 mcg/actuation nasal spray 2 sprays in each nostril daily 4/2/20  Yes Thomas ZULETA MD   AIMOVIG AUTOINJECTOR 70 mg/mL injection ADMINISTER 1 ML(70MG) UNDER THE SKIN EVERY 30 DAYS 2/19/20  Yes Thomas ZULETA MD   naratriptan (AMERGE) 2.5 mg tab TAKE 1 TABLET BY MOUTH 1 TIME FOR UP TO 1 DOSE AS NEEDED FOR MIGRAINE 1/24/20  Yes Kaylie Jo MD   topiramate ER (TROKENDI XR) 100 mg capsule TAKE 1 CAPSULE BY MOUTH DAILY 12/27/19  Yes Thomas ZULETA MD   ondansetron (ZOFRAN ODT) 8 mg disintegrating tablet Take 1 Tab by mouth every eight (8) hours as needed for Nausea. 4/30/19  Yes Bushra Machado MD   levonorgestrel (MIRENA) 20 mcg/24 hr (5 years) IUD Take by intrauterine route. Yes Provider, Historical   mirtazapine (REMERON) 45 mg tablet Take 45 mg by mouth nightly. Yes Provider, Historical   gabapentin (NEURONTIN) 300 mg capsule Take 300 mg by mouth two (2) times a day. 1 capsule in am, 1 capsule at noon and 3 capsules at bedtime    Yes Provider, Historical   escitalopram oxalate (LEXAPRO) 10 mg tablet Take 10 mg by mouth daily. Provider, Historical     No Known Allergies    Patient Active Problem List    Diagnosis Date Noted    Anxiety 09/30/2015    Insomnia 09/30/2015    Migraine 09/30/2015    Depression 09/30/2015     Past Medical History:   Diagnosis Date    Anxiety     Depression     Insomnia     Migraine      Social History     Tobacco Use    Smoking status: Former Smoker    Smokeless tobacco: Never Used   Substance Use Topics    Alcohol use: Yes     Comment: One Drink a week       Review of Systems   Constitutional: Negative for chills and fever. HENT: Positive for congestion. Negative for ear pain, sinus pain and sore throat. Respiratory: Negative for cough, hemoptysis and sputum production. Cardiovascular: Negative for chest pain. Gastrointestinal: Negative for abdominal pain. Objective:     Visit Vitals  LMP  (LMP Unknown)      General: alert, cooperative, no distress   Mental  status: normal mood, behavior, speech, dress, motor activity, and thought processes, able to follow commands   HENT: NCAT   Neck: no visualized mass   Resp: no respiratory distress, pt looked comfortable, not coughing   Neuro: no gross deficits   Skin: no discoloration or lesions of concern on visible areas   Psychiatric: normal affect, consistent with stated mood, no evidence of hallucinations     Additional exam findings: We discussed the expected course, resolution and complications of the diagnosis(es) in detail. Medication risks, benefits, costs, interactions, and alternatives were discussed as indicated. I advised her to contact the office if her condition worsens, changes or fails to improve as anticipated. She expressed understanding with the diagnosis(es) and plan. Shira Gordon is a 37 y.o. female being evaluated by a video visit encounter for concerns as above. A caregiver was present when appropriate. Due to this being a TeleHealth encounter (During [facility]- public health emergency), evaluation of the following organ systems was limited: Vitals/Constitutional/EENT/Resp/CV/GI//MS/Neuro/Skin/Heme-Lymph-Imm. Pursuant to the emergency declaration under the [facility], 1135 waiver authority and the [employer] General Act, this Virtual  Visit was conducted, with patient's (and/or legal guardian's) consent, to reduce the patient's risk of exposure to COVID-19 and provide necessary medical care.      Services were provided through a video synchronous discussion virtually to substitute for in-person clinic visit. Patient and provider were located at their individual homes.       This service was provided through Telehealth, both the (pt location) [facility] [place] [facility] and the (nurse) Karin Mendoza MD

## 2020-04-02 NOTE — PROGRESS NOTES
Chauncey Poole is a 37 y.o. female (: 1980) presenting to address:    Chief Complaint   Patient presents with    Allergies     nasal congestion, tightness in chest, wheezing times one week. Vitals:    20 0956   PainSc:   0 - No pain       Hearing/Vision:   No exam data present    Learning Assessment:     Learning Assessment 2015   PRIMARY LEARNER Patient   HIGHEST LEVEL OF EDUCATION - PRIMARY LEARNER  > 4 YEARS OF COLLEGE   BARRIERS PRIMARY LEARNER NONE   CO-LEARNER CAREGIVER No   PRIMARY LANGUAGE ENGLISH    NEED No   LEARNER PREFERENCE PRIMARY READING   LEARNING SPECIAL TOPICS none   ANSWERED BY patient   RELATIONSHIP SELF   ASSESSMENT COMMENT n/a     Depression Screening:     3 most recent PHQ Screens 2019   PHQ Not Done -   Little interest or pleasure in doing things Not at all   Feeling down, depressed, irritable, or hopeless Not at all   Total Score PHQ 2 0     Fall Risk Assessment:   No flowsheet data found. Abuse Screening:     Abuse Screening Questionnaire 2016   Do you ever feel afraid of your partner? N   Are you in a relationship with someone who physically or mentally threatens you? N   Is it safe for you to go home? Y     Coordination of Care Questionaire:   1. Have you been to the ER, urgent care clinic since your last visit? Hospitalized since your last visit? NO    2. Have you seen or consulted any other health care providers outside of the [address] [place] [address] since your last visit? Include any pap smears or colon screening. NO    Advanced Directive:   1. Do you have an Advanced Directive? NO    2. Would you like information on Advanced Directives?  NO

## 2020-04-14 ENCOUNTER — DOCUMENTATION ONLY (OUTPATIENT)
Dept: FAMILY MEDICINE CLINIC | Age: 40
End: 2020-04-14

## 2020-04-20 ENCOUNTER — TELEPHONE (OUTPATIENT)
Dept: FAMILY MEDICINE CLINIC | Age: 40
End: 2020-04-20

## 2020-04-20 NOTE — TELEPHONE ENCOUNTER
Pt is calling because she is feeling any better she stated the singular isn't helping and she would like a script for Advair.  She continue to say that she has always taking advair for asthma symptoms

## 2020-05-26 ENCOUNTER — TELEPHONE (OUTPATIENT)
Dept: FAMILY MEDICINE CLINIC | Age: 40
End: 2020-05-26

## 2020-05-26 NOTE — TELEPHONE ENCOUNTER
She need to wear a mask, it is recommended, I can NOT give her a letter to excuse her from wearing a mask, sorry.

## 2020-05-26 NOTE — TELEPHONE ENCOUNTER
Patient would like a note that excuses her from wearing a mask as she has asthma and can not breath when wearing one. Please let her know when she can pick it up.

## 2020-05-26 NOTE — TELEPHONE ENCOUNTER
Spoke with patient . She is concerned with the new order of mask's being Mandatory to wear in public . Whenever she has worn a mask,  she gets dizzy and lightheaded . 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 She will need to wear the mask for 8 hours. So she is asking for a letter to excuse her .

## 2020-05-27 NOTE — TELEPHONE ENCOUNTER
Patient notified . She voiced understanding and has been review guideline on CDC . She will practice wearing her mask to try and get use to it .

## 2020-08-18 RX ORDER — NARATRIPTAN 2.5 MG/1
TABLET ORAL
Qty: 9 TAB | Refills: 3 | Status: SHIPPED | OUTPATIENT
Start: 2020-08-18 | End: 2021-04-19

## 2020-08-18 NOTE — TELEPHONE ENCOUNTER
Pt called requesting medication refill, please advise.     Requested Prescriptions     Pending Prescriptions Disp Refills    naratriptan (AMERGE) 2.5 mg tab 9 Tab 3

## 2020-11-17 RX ORDER — FLUTICASONE PROPIONATE AND SALMETEROL 100; 50 UG/1; UG/1
1 POWDER RESPIRATORY (INHALATION) EVERY 12 HOURS
Qty: 1 EACH | Refills: 3 | Status: SHIPPED | OUTPATIENT
Start: 2020-11-17 | End: 2021-04-27 | Stop reason: SDUPTHER

## 2020-12-09 ENCOUNTER — TELEPHONE (OUTPATIENT)
Dept: FAMILY MEDICINE CLINIC | Age: 40
End: 2020-12-09

## 2020-12-09 NOTE — TELEPHONE ENCOUNTER
----- Message from Cynthia Chaney sent at 12/9/2020  3:36 PM EST -----  Regarding: in office visit  Pt needs a thyroid level check

## 2020-12-11 NOTE — TELEPHONE ENCOUNTER
Please clarify, please call and ask pt why she wants this done?, this was done in 2016 and was normal then.

## 2020-12-15 DIAGNOSIS — R68.89 SENSATION OF FEELING COLD: ICD-10-CM

## 2020-12-15 DIAGNOSIS — G43.709 CHRONIC MIGRAINE WITHOUT AURA WITHOUT STATUS MIGRAINOSUS, NOT INTRACTABLE: Primary | ICD-10-CM

## 2020-12-15 NOTE — TELEPHONE ENCOUNTER
Contacted pt. Symptoms having are: trouble sleeping, anxiety/depression, cold all the time, lack of focus. Pt is requesting an in depth testing not just TSH.     Pt has appt with parmj[facility]- Dr. Jamari Cheng but it is not until 1/27/21

## 2020-12-16 NOTE — TELEPHONE ENCOUNTER
Spoke to patient and scheduled lab appointment.     Future Appointments   Date Time Provider Renetta Pacheco   12/21/2020  9:30 AM LAB[facility]

## 2020-12-17 ENCOUNTER — APPOINTMENT (OUTPATIENT)
Dept: FAMILY MEDICINE CLINIC | Age: 40
End: 2020-12-17

## 2020-12-22 DIAGNOSIS — G43.709 CHRONIC MIGRAINE WITHOUT AURA WITHOUT STATUS MIGRAINOSUS, NOT INTRACTABLE: ICD-10-CM

## 2020-12-22 RX ORDER — ERENUMAB-AOOE 70 MG/ML
INJECTION SUBCUTANEOUS
Qty: 3 ML | Refills: 0 | Status: SHIPPED | OUTPATIENT
Start: 2020-12-22 | End: 2021-04-18

## 2021-01-05 NOTE — TELEPHONE ENCOUNTER
Patient called stating that the insurance company sent her a letter in the mail stating that her Jennifer Malcolm is requiring a PA. Please advise.

## 2021-01-13 DIAGNOSIS — G43.709 CHRONIC MIGRAINE WITHOUT AURA WITHOUT STATUS MIGRAINOSUS, NOT INTRACTABLE: ICD-10-CM

## 2021-01-13 RX ORDER — ONDANSETRON 8 MG/1
8 TABLET, ORALLY DISINTEGRATING ORAL
Qty: 30 TAB | Refills: 0 | Status: SHIPPED | OUTPATIENT
Start: 2021-01-13

## 2021-01-13 NOTE — TELEPHONE ENCOUNTER
Requested Prescriptions     Pending Prescriptions Disp Refills    ondansetron (ZOFRAN ODT) 8 mg disintegrating tablet 30 Tab 0     Sig: Take 1 Tab by mouth every eight (8) hours as needed for Nausea. Pt called to request a refill because she is completely out. Please advise. No future appointments.

## 2021-02-24 ENCOUNTER — VIRTUAL VISIT (OUTPATIENT)
Dept: FAMILY MEDICINE CLINIC | Age: 41
End: 2021-02-24
Payer: COMMERCIAL

## 2021-02-24 ENCOUNTER — TELEPHONE (OUTPATIENT)
Dept: FAMILY MEDICINE CLINIC | Age: 41
End: 2021-02-24

## 2021-02-24 DIAGNOSIS — J45.30 MILD PERSISTENT ASTHMA WITHOUT COMPLICATION: ICD-10-CM

## 2021-02-24 DIAGNOSIS — M25.60 MORNING STIFFNESS OF JOINTS: ICD-10-CM

## 2021-02-24 DIAGNOSIS — G43.709 CHRONIC MIGRAINE WITHOUT AURA WITHOUT STATUS MIGRAINOSUS, NOT INTRACTABLE: Primary | ICD-10-CM

## 2021-02-24 DIAGNOSIS — M25.50 PAIN IN JOINTS: ICD-10-CM

## 2021-02-24 PROCEDURE — 99214 OFFICE O/P EST MOD 30 MIN: CPT | Performed by: INTERNAL MEDICINE

## 2021-02-24 RX ORDER — MELOXICAM 15 MG/1
15 TABLET ORAL DAILY
Qty: 30 TAB | Refills: 1 | Status: SHIPPED | OUTPATIENT
Start: 2021-02-24 | End: 2021-02-25

## 2021-02-24 NOTE — TELEPHONE ENCOUNTER
Called pt twice, left msg twice, pcp is inquiring on need for visit. No face to face since 2019. Pt may need to be seen in office.

## 2021-02-25 RX ORDER — MELOXICAM 15 MG/1
TABLET ORAL
Qty: 90 TAB | Refills: 0 | Status: SHIPPED | OUTPATIENT
Start: 2021-02-25 | End: 2021-04-27 | Stop reason: SDUPTHER

## 2021-02-25 NOTE — PROGRESS NOTES
Marley Chaves is a 41 y.o. female who was seen by synchronous (real-time) audio-video technology on 2/24/2021 for Medication Evaluation        Assessment & Plan:   Diagnoses and all orders for this visit:    1. Chronic migraine without aura without status migrainosus, not intractable, controlled, continue current meds    2. Mild persistent asthma without complication, controlled, continue current meds    3. Pain in joints  -     RA + CCP ABS; Future  -     SED RATE (ESR); Future  -     JAH COMPREHENSIVE PANEL; Future  -     REFERRAL TO RHEUMATOLOGY  -     meloxicam (MOBIC) 15 mg tablet; Take 1 Tab by mouth daily. 4. Morning stiffness of joints  -     RA + CCP ABS; Future  -     SED RATE (ESR); Future  -     JAH COMPREHENSIVE PANEL; Future  -     REFERRAL TO RHEUMATOLOGY      Follow-up and Dispositions    · Return in about 6 months (around 8/24/2021), or if symptoms worsen or fail to improve. Lab Results   Component Value Date/Time    TSH 2.33 12/17/2020 08:03 AM     Lab Results   Component Value Date/Time    Sodium 140 12/17/2020 08:03 AM    Potassium 4.3 12/17/2020 08:03 AM    Chloride 106 12/17/2020 08:03 AM    CO2 21 12/17/2020 08:03 AM    Anion gap 13.5 12/17/2020 08:03 AM    Glucose 81 12/17/2020 08:03 AM    BUN 17 12/17/2020 08:03 AM    Creatinine 0.9 12/17/2020 08:03 AM    Calcium 9.0 12/17/2020 08:03 AM    Bilirubin, total 0.4 12/17/2020 08:03 AM    Alk.  phosphatase 51 12/17/2020 08:03 AM    Protein, total 6.4 12/17/2020 08:03 AM    Albumin 4.4 12/17/2020 08:03 AM    Globulin 2.0 12/17/2020 08:03 AM    A-G Ratio 2.2 12/17/2020 08:03 AM    ALT (SGPT) 36 12/17/2020 08:03 AM    AST (SGOT) 26 12/17/2020 08:03 AM     Lab Results   Component Value Date/Time    WBC 5.1 12/17/2020 08:03 AM    HGB 13.1 12/17/2020 08:03 AM    HCT 40.1 12/17/2020 08:03 AM    PLATELET 284 68/33/7959 08:03 AM    MCV 97 12/17/2020 08:03 AM     Subjective:   Migraine headache, controlled on Topamax and Aimovig monthly, use Amerge as needed  Asthma, controlled on Advair diskus, no wheezing or sob, use albuterol occasionally for wheezing, has not been needing to use it recently  C/o joints pain, elbows/wrists/choulders, constant, started a year ago,associated with morning stiffness that last about an hour, taking aleve in am to alleviate the pain  No joints swelling  Her mother and grandmother have RA    Prior to Admission medications    Medication Sig Start Date End Date Taking? Authorizing Provider   meloxicam (MOBIC) 15 mg tablet Take 1 Tab by mouth daily. 2/24/21  Yes Son ZULETA MD   ondansetron (ZOFRAN ODT) 8 mg disintegrating tablet Take 1 Tab by mouth every eight (8) hours as needed for Nausea. 1/13/21  Yes Macie Jo MD   Aimovig Autoinjector 70 mg/mL injection ADMINISTER 1ML(70MG) UNDER THE SKIN EVERY 30 DAYS 12/22/20  Yes Maria Hester MD   fluticasone propion-salmeteroL (Advair Diskus) 100-50 mcg/dose diskus inhaler Take 1 Puff by inhalation every twelve (12) hours. 11/17/20  Yes Maria Hester MD   naratriptan (AMERGE) 2.5 mg tab TAKE 1 TABLET BY MOUTH 1 TIME FOR UP TO 1 DOSE AS NEEDED FOR MIGRAINE 8/18/20  Yes Son ZULETA MD   cetirizine (ZyrTEC) 10 mg tablet Take 10 mg by mouth daily. Yes Provider, Historical   buPROPion XL (WELLBUTRIN XL) 150 mg tablet Take 150 mg by mouth every morning. Yes Provider, Historical   albuterol (PROVENTIL HFA, VENTOLIN HFA, PROAIR HFA) 90 mcg/actuation inhaler Take 1 Puff by inhalation every six (6) hours as needed for Wheezing or Shortness of Breath. 4/2/20  Yes Maria Hester MD   levonorgestrel (MIRENA) 20 mcg/24 hr (5 years) IUD Take by intrauterine route. Yes Provider, Historical   mirtazapine (REMERON) 45 mg tablet Take 45 mg by mouth nightly. Yes Provider, Historical   gabapentin (NEURONTIN) 300 mg capsule Take 300 mg by mouth two (2) times a day.  1 capsule in am, 1 capsule at noon and 3 capsules at bedtime    Yes Provider, Historical topiramate ER (Trokendi XR) 100 mg capsule TAKE 1 CAPSULE BY MOUTH DAILY 1/10/21   Bo ZULETA MD   fluticasone propionate (FLONASE) 50 mcg/actuation nasal spray SHAKE LIQUID AND USE 2 SPRAYS IN EACH NOSTRIL DAILY 4/2/20   Aliza Jo MD   montelukast (SINGULAIR) 10 mg tablet TAKE 1 TABLET BY MOUTH DAILY 4/2/20   Bo ZULETA MD     Patient Active Problem List    Diagnosis Date Noted    Anxiety 09/30/2015    Insomnia 09/30/2015    Migraine 09/30/2015    Depression 09/30/2015     Past Medical History:   Diagnosis Date    Anxiety     Depression     Insomnia     Migraine      Social History     Tobacco Use    Smoking status: Former Smoker    Smokeless tobacco: Never Used   Substance Use Topics    Alcohol use: Yes     Comment: One Drink a week       Review of Systems   Constitutional: Positive for malaise/fatigue. Negative for chills and fever. Respiratory: Negative for shortness of breath and wheezing. Cardiovascular: Negative for chest pain. Gastrointestinal: Negative for abdominal pain. Skin: Negative for rash. Objective:     Patient-Reported Vitals 2/24/2021   Patient-Reported Weight 139      General: alert, cooperative, no distress   Mental  status: normal mood, behavior, speech, dress, motor activity, and thought processes, able to follow commands   HENT: NCAT   Neck: no visualized mass   Resp: no respiratory distress   Neuro: no gross deficits   Skin: no discoloration or lesions of concern on visible areas   Psychiatric: normal affect, consistent with stated mood, no evidence of hallucinations     Additional exam findings: We discussed the expected course, resolution and complications of the diagnosis(es) in detail. Medication risks, benefits, costs, interactions, and alternatives were discussed as indicated. I advised her to contact the office if her condition worsens, changes or fails to improve as anticipated.  She expressed understanding with the diagnosis(es) and plan. Isabelle Shah, who was evaluated through a patient-initiated, synchronous (real-time) audio-video encounter, and/or her healthcare decision maker, is aware that it is a billable service, with coverage as determined by her insurance carrier. She provided verbal consent to proceed: Yes, and patient identification was verified. It was conducted pursuant to the emergency declaration under the [address] [place] [address] and the [employer] General Act. A caregiver was present when appropriate. Ability to conduct physical exam was limited. I was in the office. The patient was at home.       Garry Shannon MD

## 2021-03-03 ENCOUNTER — TELEPHONE (OUTPATIENT)
Dept: FAMILY MEDICINE CLINIC | Age: 41
End: 2021-03-03

## 2021-03-03 RX ORDER — OMEPRAZOLE 20 MG/1
20 CAPSULE, DELAYED RELEASE ORAL DAILY
Qty: 90 CAP | Refills: 0 | Status: SHIPPED | OUTPATIENT
Start: 2021-03-03 | End: 2021-06-06

## 2021-03-03 NOTE — TELEPHONE ENCOUNTER
Pt was given mobic from Dr Danni Kang on 2/25/21 however she has been doing research on the medication and is concerned about it eating the lining of her stomach. She is found that it is best to take the medication with a prilosec medication or like medication and is requesting that something like that be sent in because she is not comfortable taking this medication without it. Please advise.

## 2021-03-17 ENCOUNTER — DOCUMENTATION ONLY (OUTPATIENT)
Dept: FAMILY MEDICINE CLINIC | Age: 41
End: 2021-03-17

## 2021-03-24 ENCOUNTER — DOCUMENTATION ONLY (OUTPATIENT)
Dept: FAMILY MEDICINE CLINIC | Age: 41
End: 2021-03-24

## 2021-03-29 ENCOUNTER — APPOINTMENT (OUTPATIENT)
Dept: FAMILY MEDICINE CLINIC | Age: 41
End: 2021-03-29

## 2021-03-29 DIAGNOSIS — M25.50 PAIN IN JOINTS: ICD-10-CM

## 2021-03-29 DIAGNOSIS — M25.60 MORNING STIFFNESS OF JOINTS: ICD-10-CM

## 2021-03-30 ENCOUNTER — TELEPHONE (OUTPATIENT)
Dept: FAMILY MEDICINE CLINIC | Age: 41
End: 2021-03-30

## 2021-03-30 LAB
ANTI-DNA (DS) AB QN, 1189: <1 IU/ML
CCP ANTIBODY IGG,99138601: <0.5 U/ML
CENTROMERE B ANTIBODY, 601143: <0.2 AI
CHROMATIN ANTIBODY: <0.2 AI
ENA SS-A AB SER-ACNC: <0.2 AI
ENA SS-B AB SER-ACNC: 0.2 AI
JO1 ANTIBODY, 8107: <0.2 AI
RHEUMATOID FACTOR QUANT, IMMUNOTURBIDIMETRIC: <20 IU/ML (ref 0–20)
RNP ABS, 016354: <0.2 AI
SCLERODERMA AB (SCL-70), 601116: <0.2 AI
SED RATE (ESR): <=2 MM/HR (ref 0–20)
SMITH ABS, 016362: <0.2 AI

## 2021-03-30 NOTE — TELEPHONE ENCOUNTER
Reached pt. She is asking if lyme disease can be added to her labs. She has been researching and says this can be \"cross-reactivity with RA\". Pt has not made her rheumatology appt. I gave her the contact information. I reviewed with the dr. He wants her to follow with the rheumatology for additional labs. She verbalized understanding.

## 2021-04-02 NOTE — PROGRESS NOTES
Spoke with pt, she verbalized understanding of results. Will forward results to rheum. She has appt in June. 97.7

## 2021-04-19 RX ORDER — NARATRIPTAN 2.5 MG/1
TABLET ORAL
Qty: 9 TAB | Refills: 3 | Status: SHIPPED | OUTPATIENT
Start: 2021-04-19 | End: 2021-09-12

## 2021-04-27 ENCOUNTER — OFFICE VISIT (OUTPATIENT)
Dept: FAMILY MEDICINE CLINIC | Age: 41
End: 2021-04-27
Payer: COMMERCIAL

## 2021-04-27 VITALS
BODY MASS INDEX: 24.98 KG/M2 | RESPIRATION RATE: 20 BRPM | SYSTOLIC BLOOD PRESSURE: 116 MMHG | HEART RATE: 87 BPM | DIASTOLIC BLOOD PRESSURE: 78 MMHG | OXYGEN SATURATION: 99 % | TEMPERATURE: 98.3 F | WEIGHT: 141 LBS | HEIGHT: 63 IN

## 2021-04-27 DIAGNOSIS — J45.30 MILD PERSISTENT ASTHMA WITHOUT COMPLICATION: Primary | ICD-10-CM

## 2021-04-27 DIAGNOSIS — G43.709 CHRONIC MIGRAINE WITHOUT AURA WITHOUT STATUS MIGRAINOSUS, NOT INTRACTABLE: ICD-10-CM

## 2021-04-27 DIAGNOSIS — M25.50 PAIN IN JOINTS: ICD-10-CM

## 2021-04-27 PROCEDURE — 99214 OFFICE O/P EST MOD 30 MIN: CPT | Performed by: INTERNAL MEDICINE

## 2021-04-27 RX ORDER — FLUTICASONE PROPIONATE AND SALMETEROL 100; 50 UG/1; UG/1
1 POWDER RESPIRATORY (INHALATION) EVERY 12 HOURS
Qty: 1 EACH | Refills: 3 | Status: SHIPPED | OUTPATIENT
Start: 2021-04-27

## 2021-04-27 RX ORDER — MELOXICAM 15 MG/1
TABLET ORAL
Qty: 90 TAB | Refills: 0 | Status: SHIPPED | OUTPATIENT
Start: 2021-04-27 | End: 2021-05-19

## 2021-04-27 NOTE — PROGRESS NOTES
John Matthews is a 41 y.o. female (: 1980) presenting to address:    Chief Complaint   Patient presents with    Medication Evaluation       Vitals:    21 1525   BP: 116/78   Pulse: 87   Resp: 20   Temp: 98.3 °F (36.8 °C)   TempSrc: Temporal   SpO2: 99%   Weight: 141 lb (64 kg)   Height: 5' 3\" (1.6 m)   PainSc:   1   PainLoc: Generalized       Hearing/Vision:   No exam data present    Learning Assessment:     Learning Assessment 2015   PRIMARY LEARNER Patient   HIGHEST LEVEL OF EDUCATION - PRIMARY LEARNER  > 4 YEARS OF COLLEGE   BARRIERS PRIMARY LEARNER NONE   CO-LEARNER CAREGIVER No   PRIMARY LANGUAGE ENGLISH    NEED No   LEARNER PREFERENCE PRIMARY READING   LEARNING SPECIAL TOPICS none   ANSWERED BY patient   RELATIONSHIP SELF   ASSESSMENT COMMENT n/a     Depression Screening:     3 most recent PHQ Screens 2021   PHQ Not Done Active Diagnosis of Depression or Bipolar Disorder   Little interest or pleasure in doing things -   Feeling down, depressed, irritable, or hopeless -   Total Score PHQ 2 -     Fall Risk Assessment:     Fall Risk Assessment, last 12 mths 2021   Able to walk? Yes   Fall in past 12 months? 0   Do you feel unsteady? 0   Are you worried about falling 0     Abuse Screening:     Abuse Screening Questionnaire 2021   Do you ever feel afraid of your partner? N   Are you in a relationship with someone who physically or mentally threatens you? N   Is it safe for you to go home? Y     Coordination of Care Questionaire:   1. Have you been to the ER, urgent care clinic since your last visit? Hospitalized since your last visit? NO    2. Have you seen or consulted any other health care providers outside of the [address] [place] [address] since your last visit? Include any pap smears or colon screening. YES- endocrinology    Advanced Directive:   1. Do you have an Advanced Directive? YES    2. Would you like information on Advanced Directives?  NO

## 2021-04-27 NOTE — PROGRESS NOTES
HISTORY OF PRESENT ILLNESS  Johan Hernandez is a 41 y.o. female. HPI  Asthma, controlled on Advair, no wheezing or sob  Migraine headaches, not controlled, she is still getting about 5 migraines a month, and less severe headaches every 1-2 days, she stated that she is using otc excedrin frequently, she is on Aimovig and Topamax. She is using Amerge for her migraine headaches. Joints pain, improving on Mobic but still not controlled,, her recent Rheumatology labs were negative , she will see Rheumatology in June for further evaluation  Review of Systems   Constitutional: Negative for chills and fever. Respiratory: Negative for cough and shortness of breath. Cardiovascular: Negative for chest pain. Gastrointestinal: Negative for abdominal pain, nausea and vomiting. Physical Exam  Vitals signs reviewed. Cardiovascular:      Rate and Rhythm: Normal rate and regular rhythm. Heart sounds: Normal heart sounds. Pulmonary:      Effort: Pulmonary effort is normal.      Breath sounds: Normal breath sounds. Abdominal:      Palpations: Abdomen is soft. Tenderness: There is no abdominal tenderness. Musculoskeletal:      Right lower leg: No edema. Left lower leg: No edema. Neurological:      Mental Status: She is oriented to person, place, and time. ASSESSMENT and PLAN  Diagnoses and all orders for this visit:    1. Mild persistent asthma without complication, controlled  -     fluticasone propion-salmeteroL (Advair Diskus) 100-50 mcg/dose diskus inhaler; Take 1 Puff by inhalation every twelve (12) hours. 2. Chronic migraine without aura without status migrainosus, not intractable, not well controlled,  continue current meds, refer to Neurology  -     REFERRAL TO NEUROLOGY    3. Pain in joints, improving, continue Mobic  -     meloxicam (MOBIC) 15 mg tablet; TAKE 1 TABLET BY MOUTH DAILY      Follow-up and Dispositions    · Return in about 6 months (around 10/27/2021). Orders Only on 03/29/2021   Component Date Value Ref Range Status    RHEUMATOID FACTOR QUANT, IMMUNOTUR* 03/29/2021 <20  0 - 20 IU/mL Final    CCP Ab, IgG 03/29/2021 <0.5  <=2.9 U/mL Final   Appointment on 03/29/2021   Component Date Value Ref Range Status    Sjogren's Anti-SS-A 03/29/2021 <0.2  <1.0 AI Final    Sjogren's Anti-SS-B 03/29/2021 0.2  <1.0 AI Final    JO1 ANTIBODY 03/29/2021 <0.2  <1.0 AI Final    RNP Abs 03/29/2021 <0.2  <1.0 AI Final    Scleroderma Ab (SCL-70) 03/29/2021 <0.2  <1.0 AI Final    CENTROMERE B ANTIBODY 03/29/2021 <0.2  <1.0 AI Final    Smith Abs 03/29/2021 <0.2  <1.0 AI Final    CHROMATIN ANTIBODY 03/29/2021 <0.2  <1.0 AI Final    ANTI-DNA (DS) AB QN 03/29/2021 <1  <=4 IU/ml Final    Comment: Autoimmune connective tissue diseases may present with similar symptoms,   making  diagnosis difficult. The JAH comprehensive tests performed by multiplex flow  immunoassay determine specific auto-antibodies. The results of these tests  correlated with clinical evaluation may aid in the diagnosis of connective  tissue diseases, including: Scleroderma, Sj gren syndrome, systemic lupus  erythematosus, mixed connective tissue disease, CREST syndrome, and  polymyositis. JAH antibodies are the most common antibodies and may be   present  prior to disease onset. Although certain antibodies show specificity for  certain disease, JAH antibodies are not specific for connective tissue disease  and may be present due to cancer, infectious diseases, or advanced age. A  negative JAH comprehensive result does not preclude the presence of connective  tissue disease.   Antibody           Abnormal      Associated Disease                      Value  SS-A/SS-B Abs      >=1.0 AI      Supports diagnos                           is of Sjogren syndrome or                                    other connective tissue disease  Lynn-1 Abs           >=1.0 AI      Supports diagnosis of polymyositis  RNP Abs >=1.0 AI      Supports diagnosis of mixed connective tissue  disease  Scl-70 Abs         >=1.0 AI      Supports diagnosis of scleroderma  Centromere B Abs   >=1.0 AI      Supports diagnosis of CREST Syndrome  Cooley Abs          >=1.0 AI      Supports diagnosis of systemic lupus  erythematosus  Chromatin Abs      >=1.0 AI      Supports diagnosis of systemic lupus  erythematosus  dsDNA Abs         >=10.0 IU/ml   Supports diagnosis of systemic lupus  erythematosus;                                    values between 5-9 IU/ml are indeterminate      Sed rate (ESR) 03/29/2021 <=2  0 - 20 mm/hr Final

## 2021-05-04 ENCOUNTER — TELEPHONE (OUTPATIENT)
Dept: FAMILY MEDICINE CLINIC | Age: 41
End: 2021-05-04

## 2021-05-04 NOTE — TELEPHONE ENCOUNTER
Reached pt. Pt has negative mammo per dr. Brooke Davis to pt re: results and MD recommendations. Pt verbalized understanding and had no questions at this time.

## 2021-05-16 DIAGNOSIS — M25.50 PAIN IN JOINTS: ICD-10-CM

## 2021-05-19 RX ORDER — MELOXICAM 15 MG/1
TABLET ORAL
Qty: 30 TABLET | Refills: 1 | Status: SHIPPED | OUTPATIENT
Start: 2021-05-19 | End: 2021-08-25

## 2021-06-06 RX ORDER — OMEPRAZOLE 20 MG/1
CAPSULE, DELAYED RELEASE ORAL
Qty: 90 CAPSULE | Refills: 0 | Status: SHIPPED | OUTPATIENT
Start: 2021-06-06 | End: 2021-10-14

## 2021-06-18 RX ORDER — GABAPENTIN 300 MG/1
300 CAPSULE ORAL 2 TIMES DAILY
OUTPATIENT
Start: 2021-06-18

## 2021-06-18 NOTE — TELEPHONE ENCOUNTER
Reviewed with pcp. Dr Frida Cannon does not manage this medication for the patient. This is a controlled substance and it is written by another provider. Pt is aware.

## 2021-06-18 NOTE — TELEPHONE ENCOUNTER
Pt says she is out of town visiting family for the week and she forgot her gabapentin. She is requesting a fill to be sent to the [facility] in [place]. Please advise     Requested Prescriptions     Pending Prescriptions Disp Refills    gabapentin (Neurontin) 300 mg capsule       Sig: Take 1 Capsule by mouth two (2) times a day.  Max Daily Amount: 600 mg. 1 capsule in am, 1 capsule at noon and 3 capsules at bedtime

## 2021-08-25 DIAGNOSIS — M25.50 PAIN IN JOINTS: ICD-10-CM

## 2021-08-25 RX ORDER — MELOXICAM 15 MG/1
TABLET ORAL
Qty: 30 TABLET | Refills: 1 | Status: SHIPPED | OUTPATIENT
Start: 2021-08-25

## 2021-08-27 ENCOUNTER — OFFICE VISIT (OUTPATIENT)
Dept: FAMILY MEDICINE CLINIC | Age: 41
End: 2021-08-27
Payer: COMMERCIAL

## 2021-08-27 VITALS
SYSTOLIC BLOOD PRESSURE: 107 MMHG | TEMPERATURE: 98.2 F | RESPIRATION RATE: 17 BRPM | HEIGHT: 63 IN | DIASTOLIC BLOOD PRESSURE: 73 MMHG | WEIGHT: 139.2 LBS | HEART RATE: 85 BPM | BODY MASS INDEX: 24.66 KG/M2 | OXYGEN SATURATION: 100 %

## 2021-08-27 DIAGNOSIS — Z13.220 SCREENING FOR HYPERLIPIDEMIA: ICD-10-CM

## 2021-08-27 DIAGNOSIS — Z23 ENCOUNTER FOR IMMUNIZATION: ICD-10-CM

## 2021-08-27 DIAGNOSIS — R00.2 PALPITATIONS: Primary | ICD-10-CM

## 2021-08-27 DIAGNOSIS — Z13.1 SCREENING FOR DIABETES MELLITUS: ICD-10-CM

## 2021-08-27 PROCEDURE — 90471 IMMUNIZATION ADMIN: CPT | Performed by: INTERNAL MEDICINE

## 2021-08-27 PROCEDURE — 99213 OFFICE O/P EST LOW 20 MIN: CPT | Performed by: INTERNAL MEDICINE

## 2021-08-27 PROCEDURE — 93000 ELECTROCARDIOGRAM COMPLETE: CPT | Performed by: INTERNAL MEDICINE

## 2021-08-27 PROCEDURE — 90715 TDAP VACCINE 7 YRS/> IM: CPT | Performed by: INTERNAL MEDICINE

## 2021-08-27 NOTE — PROGRESS NOTES
Fer Jorge is a 41 y.o. female (: 1980) presenting to address:    Chief Complaint   Patient presents with    Dizziness       Vitals:    21 1359   BP: 107/73   Pulse: 85   Resp: 17   Temp: 98.2 °F (36.8 °C)   TempSrc: Temporal   SpO2: 100%   Weight: 139 lb 3.2 oz (63.1 kg)   Height: 5' 3\" (1.6 m)   PainSc:   0 - No pain   LMP: 2021       Hearing/Vision:   No exam data present    Learning Assessment:     Learning Assessment 2015   PRIMARY LEARNER Patient   HIGHEST LEVEL OF EDUCATION - PRIMARY LEARNER  > 4 YEARS OF COLLEGE   BARRIERS PRIMARY LEARNER NONE   CO-LEARNER CAREGIVER No   PRIMARY LANGUAGE ENGLISH    NEED No   LEARNER PREFERENCE PRIMARY READING   LEARNING SPECIAL TOPICS none   ANSWERED BY patient   RELATIONSHIP SELF   ASSESSMENT COMMENT n/a     Depression Screening:     3 most recent PHQ Screens 2021   PHQ Not Done -   Little interest or pleasure in doing things Several days   Feeling down, depressed, irritable, or hopeless Several days   Total Score PHQ 2 2     Fall Risk Assessment:     Fall Risk Assessment, last 12 mths 2021   Able to walk? Yes   Fall in past 12 months? 0   Do you feel unsteady? 0   Are you worried about falling 0     Abuse Screening:     Abuse Screening Questionnaire 2021   Do you ever feel afraid of your partner? N   Are you in a relationship with someone who physically or mentally threatens you? N   Is it safe for you to go home? Y     Coordination of Care Questionaire:   1. Have you been to the ER, urgent care clinic since your last visit? Hospitalized since your last visit? Yes, urgent care     2. Have you seen or consulted any other health care providers outside of the [address] [place] [address] since your last visit? Include any pap smears or colon screening. Yes neuro  Advanced Directive:   1. Do you have an Advanced Directive? No   2. Would you like information on Advanced Directives?   No     Health Maintenance Due Topic Date Due    DTaP/Tdap/Td series (1 - Tdap) Never done    PAP AKA CERVICAL CYTOLOGY  Never done    Lipid Screen  07/08/2021   Immunization/s administered 8/27/2021 by Harlan Elias LPN with guardian's consent. Patient tolerated procedure well. No reactions noted.     tdap right deltoid

## 2021-08-27 NOTE — PROGRESS NOTES
HISTORY OF PRESENT ILLNESS  Braulio Noel is a 41 y.o. female. HPI  C/o palpitations, she felt her heart racing yesterday, lasted about 1.5 hours, associated with lightheadedness, no LOC, no c/p or sob  She had covid-19 about 17 days ago. Review of Systems   Constitutional: Negative for chills, diaphoresis and fever. Respiratory: Negative for cough and shortness of breath. Cardiovascular: Negative for chest pain. Neurological: Negative for loss of consciousness. Physical Exam  Vitals reviewed. Cardiovascular:      Rate and Rhythm: Normal rate and regular rhythm. Heart sounds: No murmur heard. Pulmonary:      Effort: Pulmonary effort is normal.      Breath sounds: Normal breath sounds. Abdominal:      Palpations: Abdomen is soft. Tenderness: There is no abdominal tenderness. Neurological:      Mental Status: She is oriented to person, place, and time. ASSESSMENT and PLAN  Diagnoses and all orders for this visit:    1. Palpitations  -     AMB POC EKG ROUTINE W/ 12 LEADS, INTER & REP, NSR, WNL  -     CBC WITH AUTOMATED DIFF; Future  -     METABOLIC PANEL, BASIC; Future  -     CARDIAC HOLTER MONITOR; Future    2. Screening for hyperlipidemia  -     LIPID PANEL; Future    3. Screening for diabetes mellitus  -     HEMOGLOBIN A1C WITH EAG; Future    4. Encounter for immunization  -     TETANUS, DIPHTHERIA TOXOIDS AND ACELLULAR PERTUSSIS VACCINE (TDAP), IN INDIVIDS. >=7, IM  -     NE IMMUNIZ ADMIN,1 SINGLE/COMB VAC/TOXOID    Lab Results   Component Value Date/Time    TSH 2.33 12/17/2020 08:03 AM     Lab Results   Component Value Date/Time    Cholesterol, total 161 07/08/2016 11:06 AM    HDL Cholesterol 62 (H) 07/08/2016 11:06 AM    LDL, calculated 86 07/08/2016 11:06 AM    VLDL, calculated 13 07/08/2016 11:06 AM    Triglyceride 63 07/08/2016 11:06 AM       Follow-up and Dispositions    · Return in about 6 months (around 2/27/2022).

## 2021-09-12 RX ORDER — NARATRIPTAN 2.5 MG/1
TABLET ORAL
Qty: 9 TABLET | Refills: 3 | Status: SHIPPED | OUTPATIENT
Start: 2021-09-12

## 2021-09-14 RX ORDER — NARATRIPTAN 2.5 MG/1
TABLET ORAL
Qty: 9 TABLET | Refills: 3 | Status: CANCELLED | OUTPATIENT
Start: 2021-09-14

## 2021-09-14 NOTE — TELEPHONE ENCOUNTER
I called pt since med was discontinued. Pt had stopped Trokendi in August but her migraines have become more frequent. She would like her rx renewed so she can resume taking it.

## 2021-09-14 NOTE — TELEPHONE ENCOUNTER
Pt is followed by Dr Christine Rodriguez, Neurology, for her Migraines, please have her contact him for this.

## 2021-09-15 NOTE — TELEPHONE ENCOUNTER
I called pt back. She said she didn't like that dr's bedside manner and she's not going back to him. I asked her if she was seeing another neurologist for the migraines. She said she may call the group and see if she can see someone else in the group but she would like this rx from us if possible. She also notes he changed her aimovig strength. I told her I would forward her message but she should inquire into getting an appt with a new neurologist as well.

## 2021-09-22 RX ORDER — TOPIRAMATE 100 MG/1
100 CAPSULE, EXTENDED RELEASE ORAL DAILY
Qty: 30 CAPSULE | Refills: 5 | OUTPATIENT
Start: 2021-09-22

## 2021-10-14 RX ORDER — OMEPRAZOLE 20 MG/1
CAPSULE, DELAYED RELEASE ORAL
Qty: 90 CAPSULE | Refills: 0 | Status: SHIPPED | OUTPATIENT
Start: 2021-10-14

## 2021-10-16 LAB
ABSOLUTE LYMPHOCYTE COUNT, 10803: 1.5 K/UL (ref 1–4.8)
ANION GAP SERPL CALC-SCNC: 9 MMOL/L (ref 3–15)
AVG GLU, 10930: 96 MG/DL (ref 91–123)
BASOPHILS # BLD: 0.1 K/UL (ref 0–0.2)
BASOPHILS NFR BLD: 1 % (ref 0–2)
BUN SERPL-MCNC: 12 MG/DL (ref 6–22)
CALCIUM SERPL-MCNC: 9.8 MG/DL (ref 8.4–10.5)
CHLORIDE SERPL-SCNC: 105 MMOL/L (ref 98–110)
CHOLEST SERPL-MCNC: 144 MG/DL (ref 110–200)
CO2 SERPL-SCNC: 27 MMOL/L (ref 20–32)
CREAT SERPL-MCNC: 0.8 MG/DL (ref 0.5–1.2)
EOSINOPHIL # BLD: 0.3 K/UL (ref 0–0.5)
EOSINOPHIL NFR BLD: 4 % (ref 0–6)
ERYTHROCYTE [DISTWIDTH] IN BLOOD BY AUTOMATED COUNT: 11.9 % (ref 10–15.5)
GFRAA, 66117: >60
GFRNA, 66118: >60
GLUCOSE SERPL-MCNC: 78 MG/DL (ref 70–99)
GRANULOCYTES,GRANS: 66 % (ref 40–75)
HBA1C MFR BLD HPLC: 5 % (ref 4.8–5.6)
HCT VFR BLD AUTO: 37.6 % (ref 35.1–46.5)
HDLC SERPL-MCNC: 2.7 MG/DL (ref 0–5)
HDLC SERPL-MCNC: 54 MG/DL
HGB BLD-MCNC: 12.7 G/DL (ref 11.7–15.5)
LDL/HDL RATIO,LDHD: 1.5
LDLC SERPL CALC-MCNC: 81 MG/DL (ref 50–99)
LYMPHOCYTES, LYMLT: 22 % (ref 20–45)
MCH RBC QN AUTO: 31 PG (ref 26–34)
MCHC RBC AUTO-ENTMCNC: 34 G/DL (ref 31–36)
MCV RBC AUTO: 93 FL (ref 81–99)
MONOCYTES # BLD: 0.5 K/UL (ref 0.1–1)
MONOCYTES NFR BLD: 7 % (ref 3–12)
NEUTROPHILS # BLD AUTO: 4.7 K/UL (ref 1.8–7.7)
NON-HDL CHOLESTEROL, 011976: 90 MG/DL
PLATELET # BLD AUTO: 226 K/UL (ref 140–440)
PMV BLD AUTO: 11.4 FL (ref 9–13)
POTASSIUM SERPL-SCNC: 4.4 MMOL/L (ref 3.5–5.5)
RBC # BLD AUTO: 4.06 M/UL (ref 3.8–5.2)
SODIUM SERPL-SCNC: 141 MMOL/L (ref 133–145)
TRIGL SERPL-MCNC: 44 MG/DL (ref 40–149)
VLDLC SERPL CALC-MCNC: 9 MG/DL (ref 8–30)
WBC # BLD AUTO: 7.1 K/UL (ref 4–11)